# Patient Record
Sex: FEMALE | Race: OTHER
[De-identification: names, ages, dates, MRNs, and addresses within clinical notes are randomized per-mention and may not be internally consistent; named-entity substitution may affect disease eponyms.]

---

## 2017-04-14 ENCOUNTER — HOSPITAL ENCOUNTER (EMERGENCY)
Dept: HOSPITAL 62 - ER | Age: 57
Discharge: HOME | End: 2017-04-14
Payer: COMMERCIAL

## 2017-04-14 VITALS — DIASTOLIC BLOOD PRESSURE: 79 MMHG | SYSTOLIC BLOOD PRESSURE: 155 MMHG

## 2017-04-14 DIAGNOSIS — M25.561: ICD-10-CM
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
## 2017-04-14 DIAGNOSIS — M79.1: ICD-10-CM

## 2017-04-14 DIAGNOSIS — S89.91XA: Primary | ICD-10-CM
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
## 2017-04-14 PROCEDURE — 73564 X-RAY EXAM KNEE 4 OR MORE: CPT

## 2017-04-14 PROCEDURE — L1830 KO IMMOB CANVAS LONG PRE OTS: HCPCS

## 2017-04-14 PROCEDURE — 99283 EMERGENCY DEPT VISIT LOW MDM: CPT

## 2017-04-14 NOTE — ER DOCUMENT REPORT
ED General





- General


Stated Complaint: KNEE PAIN


Mode of Arrival: Ambulatory


Information source: Patient


Notes: 


56-year-old female presents after a fall off a 2 step ladder at work injuring 

her right knee.  Patient has a history of ligamentous repair of the right knee.

  Notes she was unable to bear weight or bend her knee.  Patient has no 

difficulty moving her toes has no loss of sensation or any other concerns


TRAVEL OUTSIDE OF THE [place] IN LAST 30 DAYS: No





- HPI


Onset: Just prior to arrival


Onset/Duration: Sudden


Quality of pain: Sharp


Severity: Moderate


Pain Level: 3


Associated symptoms: Body/muscle aches


Exacerbated by: Movement


Relieved by: Denies


Similar symptoms previously: No


Recently seen / treated by doctor: No





- Related Data


Allergies/Adverse Reactions: 


 





No Known Allergies Allergy (Verified 14 11:01)


 











Past Medical History





- Social History


Smoking Status: Never Smoker


Cigarette use (# per day): No


Chew tobacco use (# tins/day): No


Smoking Education Provided: No


Family History: Reviewed & Not Pertinent, Malignancy


Pulmonary Medical History: 


   Denies: Hx Tuberculosis


Neurological Medical History: Reports: Hx Migraine


Musculoskeltal Medical History: Reports Hx Arthritis


Past Surgical History: Reports: Hx  Section





- Immunizations


Hx Diphtheria, Pertussis, Tetanus Vaccination: No


Hx Pneumococcal Vaccination: 12





Review of Systems





- Review of Systems


Notes: 


REVIEW OF SYSTEMS:


CONSTITUTIONAL :  Denies fever,  chills, or sweats.  Denies recent illness.


EENT:   Denies eye, ear, throat, or mouth pain or symptoms.  Denies nasal or 

sinus congestion or discharge.  Denies throat, tongue, or mouth swelling or 

difficulty swallowing.


CARDIOVASCULAR:  Denies chest pain.  Denies palpitations or racing or irregular 

heart beat.  Denies ankle edema.


RESPIRATORY:  Denies cough, cold, or chest congestion.  Denies shortness of 

breath, difficulty breathing, or wheezing.


GASTROINTESTINAL:  Denies abdominal pain or distention.  Denies nausea, vomiting

, or diarrhea.  Denies blood in vomitus, stools, or per rectum.  Denies black, 

tarry stools.  Denies constipation. 


GENITOURINARY:  Denies difficulty urinating, painful urination, burning, 

frequency, blood in urine, or discharge.


FEMALE  GENITOURINARY:  Denies vaginal bleeding, heavy or abnormal periods, 

irregular periods.  Denies vaginal discharge or odor. 


MUSCULOSKELETAL: Admits to right knee pain


SKIN:   Denies rash, lesions or sores.


HEMATOLOGIC :   Denies easy bruising or bleeding.


LYMPHATIC:  Denies swollen, enlarged glands.


NEUROLOGICAL:  Denies confusion or altered mental status.  Denies passing out 

or loss of consciousness.  Denies dizziness or lightheadedness.  Denies 

headache.  Denies weakness or paralysis or loss of use of either side.  Denies 

problems with gait or speech.  Denies sensory loss, numbness, or tingling.  

Denies seizures.


PSYCHIATRIC:  Denies anxiety or stress.  Denies depression, suicidal ideation, 

or homicidal ideation.





ALL OTHER SYSTEMS REVIEWED AND NEGATIVE.








Dictation was performed using Dragon voice recognition software 











PHYSICAL EXAMINATION:





GENERAL: Well-appearing, well-nourished and in no acute distress.





HEAD: Atraumatic, normocephalic.





EYES: Pupils equal round and reactive to light, extraocular movements intact, 

conjunctiva are normal.





ENT: Nares patent, oropharynx clear without exudates.  Moist mucous membranes.





NECK: Normal range of motion, supple without lymphadenopathy





LUNGS: Breath sounds clear to auscultation bilaterally and equal.  No wheezes 

rales or rhonchi.





HEART: Regular rate and rhythm without murmurs





ABDOMEN: Soft, nontender, nondistended abdomen.  No guarding, no rebound.  No 

masses appreciated.





Female : deferred





Musculoskeletal: Right lower extremity in makeshift splint, tenderness at the 

knee no obvious deformity or swelling.  Patient is able to move digits no 

neurological deficits or arterial injury





NEUROLOGICAL: Cranial nerves grossly intact.  Normal speech, normal gait.  

Normal sensory, motor exams





PSYCH: Normal mood, normal affect.





SKIN: Warm, Dry, normal turgor, no rashes or lesions noted.





Course





- Re-evaluation


Re-evalutation: 


17 14:33


X-ray pending at this time pain control given





17 15:52


X-ray noted no acute abnormality, patient is noted to still be quite tender 

after pain medication was given, therefore I will place her in the knee 

immobilizer and give her orthopedic follow-up with crutches





After performing a Medical Screening Examination, I estimate there is LOW risk 

for INTRACRANIAL HEMORRHAGE, UNSTABLE SPINE FRACTURE, CENTRAL CORD SYNDROME, 

CAUDA EQUINA, THORACIC AORTIC DISSECTION, PNEUMOTHORAX, PERFORATED BOWEL, 

RUPTURED ABDOMINAL AORTIC ANEURYSM, ACUTE TENDON RUPTURE, COMPARTMENT SYNDROME, 

or OPEN FRACTURE, thus I consider the discharge disposition reasonable. Also, 

there is no evidence or peritonitis, sepsis, or toxicity.  I have reevaluated 

this patient multiple times and no significant life threatening changes are 

noted. The patient and I have discussed the diagnosis and risks, and we agree 

with discharging home to follow-up with their primary doctor with the 

understanding that symptoms and presentations can change. We also discussed 

returning to the Emergency Department immediately if new or worsening symptoms 

occur. We have discussed the symptoms which are most concerning (e.g., bloody 

stool, fever, changing or worsening pain, vomiting) that necessitate immediate 

return.





- Diagnostic Test


Radiology reviewed: Image reviewed, Reports reviewed





Procedures





- Immobilization


  ** Right Knee


Time completed: 15:53


Pre-Proc Neuro Vasc Exam: Normal


Immobilizer type: Crutches, Knee immobilizer


Performed by: PCT


Post-Proc Neuro Vasc Exam: Normal


Alignment checked and good: Yes





Discharge





- Discharge


Clinical Impression: 


Knee injury


Qualifiers:


 Encounter type: initial encounter Laterality: right Qualified Code(s): 

S89.91XA - Unspecified injury of right lower leg, initial encounter





Knee pain


Qualifiers:


 Laterality: right Chronicity: acute Qualified Code(s): M25.561 - Pain in right 

knee





Condition: Stable


Disposition: HOME, SELF-CARE


Instructions:  Use of Crutches ([facility]), Knee Immobilizing Splint ([facility]), Suspected 

Internal Knee Injury ([facility])


Prescriptions: 


Oxycodone HCl/Acetaminophen [Percocet 5-325 mg Tablet] 1 - 2 tab PO Q4H PRN #15 

tablet


 PRN Reason: 


Forms:  Return to Work


Referrals: 


SIM OSORIO MD [ACTIVE STAFF] - Follow up tomorrow

## 2018-07-18 ENCOUNTER — HOSPITAL ENCOUNTER (OUTPATIENT)
Dept: HOSPITAL 62 - WI | Age: 58
End: 2018-07-18
Attending: PHYSICIAN ASSISTANT
Payer: COMMERCIAL

## 2018-07-18 DIAGNOSIS — Z12.31: Primary | ICD-10-CM

## 2018-07-18 PROCEDURE — 77067 SCR MAMMO BI INCL CAD: CPT

## 2018-07-18 NOTE — WOMENS IMAGING REPORT
EXAM DESCRIPTION:  BILAT SCREENING MAMMO W/CAD



COMPLETED DATE/TIME:  7/18/2018 9:10 am



REASON FOR STUDY:  SCREENING MAMMO Z12.31  ENCNTR SCREEN MAMMOGRAM FOR MALIGNANT NEOPLASM OF RISHI



COMPARISON:  Multiple since 2009



TECHNIQUE:  Standard craniocaudal and mediolateral oblique views of each breast recorded using digita
l acquisition.



LIMITATIONS:  None.



FINDINGS:  Findings present which are benign by mammographic criteria.  No suspicious masses, calcifi
cations or architectural distortion.

Pertinent benign findings: Asymmetric fibroglandular tissue in the upper outer quadrant right breast 
unchanged

Read with the assistance of CAD.

.[facility] - R2 Cenova Version 1.3

.[facility] - R2 Cenova Version 1.3

.[facility] - R2 Cenova Version 2.4

.[facility] - R2 Cenova Version 2.4

.[facility] - R2  Version 9.2

Benign mammographic findings may include one or more of the following:  Smooth masses, popcorn/rim/co
arse calcifications, asymmetries, post-procedure changes, and lesions with long-standing stability.



IMPRESSION:  BENIGN MAMMOGRAPHIC FINDINGS.  BIRADS 2



BREAST DENSITY:  c. The breasts are heterogeneously dense, which may obscure small masses.



BIRAD:  2 BENIGN FINDING(S)



RECOMMENDATION:  ROUTINE SCREENING.  Please consider screening tomosynthesis given heterogeneously de
nse tissue

Please continue yearly bilateral screening mammography/tomosynthesis in July 2019.



COMMENT:  The patient has been notified of the results by letter per MQSA requirements. Additional no
tification policies are in place for contacting patient with suspicious or incomplete findings.

Quality ID #225: The American College of Radiology recommends an annual screening mammogram for women
 aged 40 years or over. This facility utilizes a reminder system to ensure that all patients receive 
reminder letters, and/or direct phone calls for appointments. This includes reminders for routine scr
eening mammograms, diagnostic mammograms, or other Breast Imaging Interventions when appropriate.  Th
is patient will be placed in the appropriate reminder system.

The American College of Radiology (ACR) has developed recommendations for screening MRI of the breast
s in certain patient populations, to be used in conjunction with mammography.  Breast MRI surveillanc
e may be appropriate for women with more than 20% lifetime risk of developing breast cancer  as deter
mined by genetic testing, significant family history of the disease, or history of mantle radiation f
or Hodgkins Disease.  ACR Practice Guidelines 2008.



TECHNICAL DOCUMENTATION:  FINDING NUMBER: (1)

ASSESSMENT: (1)

JOB ID:  [id number]

 2011 [email or web address] All Rights Reserved



Reading location - IP/workstation name: [facility]-2

## 2018-08-08 ENCOUNTER — HOSPITAL ENCOUNTER (OUTPATIENT)
Dept: HOSPITAL 62 - SC | Age: 58
Discharge: HOME | End: 2018-08-08
Attending: OPHTHALMOLOGY
Payer: COMMERCIAL

## 2018-08-08 DIAGNOSIS — E78.00: ICD-10-CM

## 2018-08-08 DIAGNOSIS — Z79.899: ICD-10-CM

## 2018-08-08 DIAGNOSIS — H25.11: Primary | ICD-10-CM

## 2018-08-08 PROCEDURE — V2787 ASTIGMATISM-CORRECT FUNCTION: HCPCS

## 2018-08-08 PROCEDURE — 66984 XCAPSL CTRC RMVL W/O ECP: CPT

## 2018-08-08 RX ADMIN — TETRACAINE HYDROCHLORIDE PRN DROP: 25 LIQUID OPHTHALMIC at 08:44

## 2018-08-08 RX ADMIN — CYCLOPENTOLATE HYDROCHLORIDE AND PHENYLEPHRINE HYDROCHLORIDE PRN DROP: 2; 10 SOLUTION/ DROPS OPHTHALMIC at 08:30

## 2018-08-08 RX ADMIN — BESIFLOXACIN PRN DROP: 6 SUSPENSION OPHTHALMIC at 08:15

## 2018-08-08 RX ADMIN — TETRACAINE HYDROCHLORIDE PRN DROP: 25 LIQUID OPHTHALMIC at 08:39

## 2018-08-08 RX ADMIN — BESIFLOXACIN PRN DROP: 6 SUSPENSION OPHTHALMIC at 08:07

## 2018-08-08 RX ADMIN — CYCLOPENTOLATE HYDROCHLORIDE AND PHENYLEPHRINE HYDROCHLORIDE PRN DROP: 2; 10 SOLUTION/ DROPS OPHTHALMIC at 08:15

## 2018-08-08 RX ADMIN — TETRACAINE HYDROCHLORIDE PRN DROP: 25 LIQUID OPHTHALMIC at 08:07

## 2018-08-08 RX ADMIN — TROPICAMIDE PRN DROP: 10 SOLUTION/ DROPS OPHTHALMIC at 08:15

## 2018-08-08 RX ADMIN — TROPICAMIDE PRN DROP: 10 SOLUTION/ DROPS OPHTHALMIC at 08:30

## 2018-08-08 RX ADMIN — CYCLOPENTOLATE HYDROCHLORIDE AND PHENYLEPHRINE HYDROCHLORIDE PRN DROP: 2; 10 SOLUTION/ DROPS OPHTHALMIC at 08:07

## 2018-08-08 RX ADMIN — TROPICAMIDE PRN DROP: 10 SOLUTION/ DROPS OPHTHALMIC at 08:07

## 2018-08-22 ENCOUNTER — HOSPITAL ENCOUNTER (OUTPATIENT)
Dept: HOSPITAL 62 - SC | Age: 58
Discharge: HOME | End: 2018-08-22
Attending: OPHTHALMOLOGY
Payer: COMMERCIAL

## 2018-08-22 DIAGNOSIS — E78.00: ICD-10-CM

## 2018-08-22 DIAGNOSIS — H25.12: Primary | ICD-10-CM

## 2018-08-22 DIAGNOSIS — Z98.41: ICD-10-CM

## 2018-08-22 PROCEDURE — V2787 ASTIGMATISM-CORRECT FUNCTION: HCPCS

## 2018-08-22 PROCEDURE — 66984 XCAPSL CTRC RMVL W/O ECP: CPT

## 2018-08-22 RX ADMIN — TOBRAMYCIN AND DEXAMETHASONE ONE APPLIC: 3; 1 OINTMENT OPHTHALMIC at 00:00

## 2018-08-22 RX ADMIN — BESIFLOXACIN PRN DROP: 6 SUSPENSION OPHTHALMIC at 07:22

## 2018-08-22 RX ADMIN — BESIFLOXACIN PRN DROP: 6 SUSPENSION OPHTHALMIC at 08:32

## 2018-08-22 RX ADMIN — TROPICAMIDE PRN DROP: 10 SOLUTION/ DROPS OPHTHALMIC at 07:33

## 2018-08-22 RX ADMIN — TROPICAMIDE PRN DROP: 10 SOLUTION/ DROPS OPHTHALMIC at 07:44

## 2018-08-22 RX ADMIN — SODIUM CHONDROITIN SULFATE / SODIUM HYALURONATE ONE EACH: 0.55-0.5 INJECTION INTRAOCULAR at 08:23

## 2018-08-22 RX ADMIN — TOBRAMYCIN AND DEXAMETHASONE ONE APPLIC: 3; 1 OINTMENT OPHTHALMIC at 08:32

## 2018-08-22 RX ADMIN — SODIUM CHONDROITIN SULFATE / SODIUM HYALURONATE ONE EACH: 0.55-0.5 INJECTION INTRAOCULAR at 08:17

## 2018-08-22 RX ADMIN — CYCLOPENTOLATE HYDROCHLORIDE AND PHENYLEPHRINE HYDROCHLORIDE PRN DROP: 2; 10 SOLUTION/ DROPS OPHTHALMIC at 07:33

## 2018-08-22 RX ADMIN — BESIFLOXACIN PRN DROP: 6 SUSPENSION OPHTHALMIC at 00:00

## 2018-08-22 RX ADMIN — CYCLOPENTOLATE HYDROCHLORIDE AND PHENYLEPHRINE HYDROCHLORIDE PRN DROP: 2; 10 SOLUTION/ DROPS OPHTHALMIC at 07:44

## 2018-08-22 RX ADMIN — CYCLOPENTOLATE HYDROCHLORIDE AND PHENYLEPHRINE HYDROCHLORIDE PRN DROP: 2; 10 SOLUTION/ DROPS OPHTHALMIC at 07:22

## 2018-08-22 RX ADMIN — BESIFLOXACIN PRN DROP: 6 SUSPENSION OPHTHALMIC at 07:36

## 2018-08-22 RX ADMIN — TETRACAINE HYDROCHLORIDE PRN DROP: 25 LIQUID OPHTHALMIC at 07:46

## 2018-08-22 RX ADMIN — TETRACAINE HYDROCHLORIDE PRN DROP: 25 LIQUID OPHTHALMIC at 07:21

## 2018-08-22 RX ADMIN — TROPICAMIDE PRN DROP: 10 SOLUTION/ DROPS OPHTHALMIC at 07:22

## 2019-02-22 ENCOUNTER — HOSPITAL ENCOUNTER (EMERGENCY)
Dept: HOSPITAL 62 - ER | Age: 59
Discharge: HOME | End: 2019-02-22
Payer: SELF-PAY

## 2019-02-22 VITALS — SYSTOLIC BLOOD PRESSURE: 131 MMHG | DIASTOLIC BLOOD PRESSURE: 74 MMHG

## 2019-02-22 DIAGNOSIS — R11.10: ICD-10-CM

## 2019-02-22 DIAGNOSIS — R51: ICD-10-CM

## 2019-02-22 DIAGNOSIS — R42: Primary | ICD-10-CM

## 2019-02-22 PROCEDURE — 96361 HYDRATE IV INFUSION ADD-ON: CPT

## 2019-02-22 PROCEDURE — 96374 THER/PROPH/DIAG INJ IV PUSH: CPT

## 2019-02-22 PROCEDURE — 96375 TX/PRO/DX INJ NEW DRUG ADDON: CPT

## 2019-02-22 PROCEDURE — 99283 EMERGENCY DEPT VISIT LOW MDM: CPT

## 2019-02-22 NOTE — ER DOCUMENT REPORT
ED General





- General


Chief Complaint: Dizziness


Stated Complaint: HEADACHE/VOMITING


Time Seen by Provider: 19 09:53


Primary Care Provider: 


WICHO WALKER PA-C [Primary Care Provider] - Follow up in 3-5 days


Notes: 





Patient is a 58-year-old female with history of migraines that presents to the 

emergency department for chief complaint of headache.  Patient states that 

around 4:30 AM this morning, she is going to work, developed migraine type 

headache, she described as holocephalic, 9 out of 10 with associated 

lightheadedness and vomiting.  She states this is typical of her migraines, she 

did not take anything prior to ED arrival to try to help her headache.  She 

states she gets them intermittently throughout the year and this is very similar

to her priors.  She has associated photophobia and phonophobia.  Denies having 

any aura, numbness, weakness or tingling in any extremity.  At this time she is 

had nausea, denies fevers, chills, night sweats, neck stiffness, chest pain, 

shortness of breath or difficulty breathing, no other complaints at this time.





Past Medical History: Migraine headaches


Past Surgical History: , knee surgery


Social History: Denies current tobacco, alcohol or illicit drug use


Family History: Reviewed and noncontributory for presenting illness


Allergies: Reviewed, see documented allergy list. 





REVIEW OF SYSTEMS:


Other than noted above, the 12 point review of systems was reviewed with the 

patient and were negative, all pertinent findings are included in the HPI.





PHYSICAL EXAMINATION:





Vital signs reviewed, nursing noted reviewed. 





GENERAL: Patient appears uncomfortable, but in no acute respiratory distress





HEAD: Atraumatic, normocephalic.





EYES: Eyes appear normal, extraocular movements intact, sclera anicteric, 

conjunctiva are normal.





ENT: nares patent, oropharynx clear without exudates.  Moist mucous membranes.  

Sinuses nontender to palpate





NECK: Normal range of motion, supple without lymphadenopathy





LUNGS: Breath sounds clear to auscultation bilaterally and equal.  No wheezes 

rales or rhonchi.





HEART: Regular rate and rhythm without murmurs





ABDOMEN: Soft, nontender, normoactive bowel sounds.  No rebound, guarding, or 

rigidity. No masses appreciated.





EXTREMITIES: Nontender, good range of motion, no pitting or edema.  





NEUROLOGICAL: No focal neurological deficits. Moves all extremities 

spontaneously Motor and sensory grossly intact on exam.





PSYCH: Normal mood, normal affect.





SKIN: Warm, Dry, normal turgor, no rashes or lesions noted on exposed skin





TRAVEL OUTSIDE OF THE [place] IN LAST 30 DAYS: No





- Related Data


Allergies/Adverse Reactions: 


                                        





No Known Allergies Allergy (Verified 19 09:19)


   











Past Medical History





- Social History


Smoking Status: Unknown if Ever Smoked


Family History: Reviewed & Not Pertinent, Malignancy


Patient has suicidal ideation: No


Patient has homicidal ideation: No





- Past Medical History


Cardiac Medical History: 


   Denies: Hx Heart Attack, Hx Hypertension


Pulmonary Medical History: 


   Denies: Hx Asthma, Hx Tuberculosis


Neurological Medical History: Reports: Hx Migraine.  Denies: Hx Cerebrovascular 

Accident, Hx Seizures


Renal/ Medical History: Denies: Hx Peritoneal Dialysis


GI Medical History: Denies: Hx Hepatitis, Hx Hiatal Hernia, Hx Ulcer


Musculoskeletal Medical History: Reports Hx Arthritis


Infectious Medical History: Denies: Hx Hepatitis


Past Surgical History: Reports: Hx  Section.  Denies: Hx Mastectomy, Hx 

Open Heart Surgery, Hx Pacemaker





- Immunizations


Hx Diphtheria, Pertussis, Tetanus Vaccination: No


Hx Pneumococcal Vaccination: 12





Physical Exam





- Vital signs


Vitals: 


                                        











Temp Pulse Resp BP Pulse Ox


 


 97.9 F   66   18   145/59 H  100 


 


 19 09:23  19 09:23  19 09:23  19 09:23  19 09:23














Course





- Re-evaluation


Re-evalutation: 





Patient seen and examined vital signs reviewed. 





Patient was evaluated and treated as appropriate for the patient's presenting 

symptoms and complaint, with consideration of any critical or life threatening 

conditions that may be associated with their obtained history and exam as noted 

above.





Patient was treated with IV fluids, Reglan, dexamethasone and Toradol





The patient was re-evaluated and was improved her headache had completely 

resolved





Evaluation was most consistent with headache, nonspecific, typical of the 

patient's migraines, low risk for life-threatening causes of headache, based on 

presentation, history of migraines, and complete resolution of headache 





Plan of care was discussed with the patient at this point, after careful 

consideration I feel that that patient can be discharged from the emergency 

department, the patient was educated treatments and reasons to return to the 

emergency department based on their presumed diagnosis as noted above, they were

advised to followup with a primary care physician in 2-3 days. Patient was 

agreeable to plan of care.





*Note is created using voice recognition software and may contain spelling, 

syntax or grammatical errors.








- Vital Signs


Vital signs: 


                                        











Temp Pulse Resp BP Pulse Ox


 


 97.5 F   65   18   131/74 H  100 


 


 19 11:47  19 11:47  19 11:47  19 11:47  19 11:47














Discharge





- Discharge


Clinical Impression: 


 Headache





Condition: Stable


Disposition: HOME, SELF-CARE


Instructions:  Headache ([facility])


Forms:  Return to Work


Referrals: 


WICHO WALKER PA-C [Primary Care Provider] - Follow up in 3-5 days

## 2020-10-18 ENCOUNTER — HOSPITAL ENCOUNTER (EMERGENCY)
Dept: HOSPITAL 62 - ER | Age: 60
Discharge: HOME | End: 2020-10-18
Payer: SELF-PAY

## 2020-10-18 VITALS — SYSTOLIC BLOOD PRESSURE: 112 MMHG | DIASTOLIC BLOOD PRESSURE: 53 MMHG

## 2020-10-18 DIAGNOSIS — H92.02: ICD-10-CM

## 2020-10-18 DIAGNOSIS — H60.502: Primary | ICD-10-CM

## 2020-10-18 PROCEDURE — 99283 EMERGENCY DEPT VISIT LOW MDM: CPT

## 2020-10-18 NOTE — ER DOCUMENT REPORT
HPI





- HPI


Time Seen by Provider: 10/18/20 13:43


Pain Level: 3


Context: 





Patient is 60-year-old female who presents to the emergency department with a 

chief complaint of left ear pain.  Patient states that her pain started 2 days 

ago.  Patient states that the pain got worse.  States that pain traveled from 

her ear down to her left neck.  Denies any loss of hearing.





- ROS


Systems Reviewed and Negative: Yes All other systems reviewed and negative





- CONSTITUTIONAL


Constitutional: DENIES: Fever, Chills





- EENT


EENT: REPORTS: Ear Pain - Left





- REPRODUCTIVE


Reproductive: DENIES: Pregnant:





- MUSCULOSKELETAL


Musculoskeletal: DENIES: Extremity pain





- DERM


Skin Color: Normal


Skin Problems: None





Past Medical History





- Social History


Smoking Status: Never Smoker


Chew tobacco use (# tins/day): No


Drug Abuse: None


Family History: Reviewed & Not Pertinent, Malignancy





- Past Medical History


Cardiac Medical History: 


   Denies: Hx Heart Attack, Hx Hypertension


Pulmonary Medical History: 


   Denies: Hx Asthma, Hx Tuberculosis


Neurological Medical History: Reports: Hx Migraine.  Denies: Hx Cerebrovascular 

Accident, Hx Seizures


Renal/ Medical History: Denies: Hx Peritoneal Dialysis


GI Medical History: Denies: Hx Hepatitis, Hx Hiatal Hernia, Hx Ulcer


Musculoskeletal Medical History: Reports Hx Arthritis


Infectious Medical History: Denies: Hx Hepatitis


Past Surgical History: Reports: Hx  Section.  Denies: Hx Mastectomy, Hx 

Open Heart Surgery, Hx Pacemaker





- Immunizations


Hx Diphtheria, Pertussis, Tetanus Vaccination: No


Hx Pneumococcal Vaccination: 12





Vertical Provider Document





- CONSTITUTIONAL


Agree With Documented VS: Yes


Exam Limitations: No Limitations


General Appearance: No Apparent Distress





- INFECTION CONTROL


TRAVEL OUTSIDE OF THE [place] IN LAST 30 DAYS: No





- HEENT


HEENT: Atraumatic, Normocephalic, PERRLA.  negative: Tympanic Membrane Red, 

Tympanic Membrane Bulging


Notes: 





Slight edema noted to left external auditory canal; tenderness at tragus.





- NECK


Neck: Normal Inspection





- RESPIRATORY


Respiratory: No Respiratory Distress





- MUSCULOSKELETAL/EXTREMETIES


Musculoskeletal/Extremeties: FROM





- NEURO


Level of Consciousness: Awake, Alert, Appropriate


Motor/Sensory: No Motor Deficit, No Sensory Deficit





Course





- Re-evaluation


Re-evalutation: 





10/18/20 13:55


Patient's physical exam and history is consistent with otitis externa.  Patient 

will be placed on Ciprodex drops.  I do not suspect patient has mastoiditis, as 

there is no pain at the mastoid process.  Follow-up precautions were given.  Beena

bal discharge instructions were given to the patient.  They verbalized 

understanding.  They are stable for discharge.








- Vital Signs


Vital signs: 


                                        











Temp Pulse Resp BP Pulse Ox


 


 98.5 F   70   16   112/53 L  97 


 


 10/18/20 13:38  10/18/20 13:38  10/18/20 13:38  10/18/20 13:38  10/18/20 13:38














Discharge





- Discharge


Clinical Impression: 


Otitis externa


Qualifiers:


 Otitis externa type: unspecified type Chronicity: acute Laterality: left 

Qualified Code(s): H60.502 - Unspecified acute noninfective otitis externa, left

ear





Condition: Stable


Disposition: HOME, SELF-CARE


Instructions:  Otitis Externa ([facility])


Additional Instructions: 


Otitis Externa





     You have otitis externa -- an infection of the outer ear canal. This can be

very painful.  It's sometimes called "swimmer's ear," because it often occurs 

after prolonged water exposure.  Many things, such as earwax and dirt in the ea

r, can contribute to it.


     The usual treatment is antibiotic/antiinflammatory ear drops. Occasionally,

a wick will be placed in the ear to draw in the medicine. If the infection is 

severe, an oral antibiotic may be prescribed.  Pain medication is often needed. 

Avoid getting water in the ear.


     Outer ear infections often take longer to heal than you might expect.  Some

tenderness and ache in the ear may persist for about two weeks.


     See your physician if you fail to improve as expected.  Call the doctor at 

once if you develop fever, increasing swelling (particularly if it makes your 

ear "poke out"), severe headache, stiff neck, or decreased hearing.





Place 4 drops to your left ear twice a day for 7 days.





Forms:  Return to Work


Referrals: 


WICHO WALKER PA-C [Primary Care Provider] - Follow up as needed